# Patient Record
Sex: MALE | Race: BLACK OR AFRICAN AMERICAN | NOT HISPANIC OR LATINO | Employment: FULL TIME | URBAN - METROPOLITAN AREA
[De-identification: names, ages, dates, MRNs, and addresses within clinical notes are randomized per-mention and may not be internally consistent; named-entity substitution may affect disease eponyms.]

---

## 2023-09-05 PROBLEM — R47.1 DYSARTHRIA AND ANARTHRIA: Status: ACTIVE | Noted: 2023-09-05

## 2023-09-05 PROBLEM — I63.9 ACUTE ISCHEMIC STROKE: Status: ACTIVE | Noted: 2023-09-05

## 2023-09-05 PROBLEM — I63.9 CVA (CEREBRAL VASCULAR ACCIDENT): Status: ACTIVE | Noted: 2023-09-05

## 2023-09-05 PROBLEM — I16.1 HYPERTENSIVE EMERGENCY: Status: ACTIVE | Noted: 2023-09-05

## 2023-09-05 PROBLEM — G81.94 HEMIPARESIS, LEFT: Status: ACTIVE | Noted: 2023-09-05

## 2023-09-05 PROBLEM — I10 HYPERTENSION: Chronic | Status: ACTIVE | Noted: 2023-09-05

## 2023-09-13 PROBLEM — Z12.11 COLON CANCER SCREENING: Status: ACTIVE | Noted: 2023-09-13

## 2023-10-06 PROBLEM — R06.81 WITNESSED EPISODE OF APNEA: Status: ACTIVE | Noted: 2023-10-06

## 2023-10-06 PROBLEM — G47.30 SLEEP-DISORDERED BREATHING: Status: ACTIVE | Noted: 2023-10-06

## 2023-10-06 PROBLEM — E66.9 OBESITY: Status: ACTIVE | Noted: 2023-10-06

## 2023-10-06 PROBLEM — R53.83 FATIGUE: Status: ACTIVE | Noted: 2023-10-06

## 2023-10-06 PROBLEM — R06.83 SNORING: Status: ACTIVE | Noted: 2023-10-06

## 2023-10-06 PROBLEM — G47.33 OSA (OBSTRUCTIVE SLEEP APNEA): Status: ACTIVE | Noted: 2023-10-06

## 2023-10-06 PROBLEM — G47.19 EXCESSIVE DAYTIME SLEEPINESS: Status: ACTIVE | Noted: 2023-10-06

## 2023-10-06 PROBLEM — Z91.89 AT RISK FOR OBSTRUCTIVE SLEEP APNEA: Status: ACTIVE | Noted: 2023-10-06

## 2023-10-06 PROBLEM — G47.8 NON-RESTORATIVE SLEEP: Status: ACTIVE | Noted: 2023-10-06

## 2023-10-20 ENCOUNTER — TELEPHONE (OUTPATIENT)
Dept: ADMINISTRATIVE | Facility: HOSPITAL | Age: 53
End: 2023-10-20

## 2023-10-20 VITALS — SYSTOLIC BLOOD PRESSURE: 139 MMHG | DIASTOLIC BLOOD PRESSURE: 76 MMHG

## 2023-11-22 PROBLEM — I16.1 HYPERTENSIVE EMERGENCY: Status: RESOLVED | Noted: 2023-09-05 | Resolved: 2023-11-22

## 2023-12-11 PROBLEM — I63.9 CVA (CEREBRAL VASCULAR ACCIDENT): Status: RESOLVED | Noted: 2023-09-05 | Resolved: 2023-12-11

## 2023-12-11 PROBLEM — I63.9 ACUTE ISCHEMIC STROKE: Status: RESOLVED | Noted: 2023-09-05 | Resolved: 2023-12-11

## 2024-03-08 PROBLEM — M79.2 NEUROPATHIC PAIN: Status: ACTIVE | Noted: 2024-03-08

## 2024-03-13 ENCOUNTER — TELEPHONE (OUTPATIENT)
Dept: ADMINISTRATIVE | Facility: HOSPITAL | Age: 54
End: 2024-03-13

## 2024-03-13 VITALS — DIASTOLIC BLOOD PRESSURE: 71 MMHG | SYSTOLIC BLOOD PRESSURE: 130 MMHG

## 2024-04-03 PROBLEM — E78.5 HYPERLIPIDEMIA: Status: ACTIVE | Noted: 2024-04-03

## 2024-10-10 PROBLEM — Z86.73 HISTORY OF STROKE: Status: ACTIVE | Noted: 2024-10-10

## 2024-10-11 PROBLEM — G47.20 CIRCADIAN DYSREGULATION: Status: ACTIVE | Noted: 2024-10-11

## 2024-10-11 PROBLEM — R29.818 SUSPECTED SLEEP APNEA: Status: ACTIVE | Noted: 2024-10-11

## 2024-10-11 PROBLEM — Z72.821 INADEQUATE SLEEP HYGIENE: Status: ACTIVE | Noted: 2024-10-11

## 2025-05-12 PROBLEM — G89.0 THALAMIC PAIN SYNDROME (HYPERESTHETIC): Status: ACTIVE | Noted: 2025-05-12

## 2025-05-12 PROBLEM — G47.09 OTHER INSOMNIA: Status: ACTIVE | Noted: 2025-05-12

## 2025-05-12 PROBLEM — I63.81 THALAMIC STROKE: Status: ACTIVE | Noted: 2023-09-05

## 2025-07-23 ENCOUNTER — OUTPATIENT CASE MANAGEMENT (OUTPATIENT)
Dept: ADMINISTRATIVE | Facility: OTHER | Age: 55
End: 2025-07-23

## 2025-07-23 NOTE — PROGRESS NOTES
Sw received a consult to assist with financial resources. Alda called Patient. A voice message was left requesting he call back.    Amirah Smith LCSW    [phone]

## 2025-07-24 ENCOUNTER — OUTPATIENT CASE MANAGEMENT (OUTPATIENT)
Dept: ADMINISTRATIVE | Facility: OTHER | Age: 55
End: 2025-07-24

## 2025-07-24 NOTE — LETTER
Kenneth Flores  [address] [facility] [address]      Dear Kenneth Flores,     I am writing from the Outpatient Care Management Department at [facility]. I received a referral from [facility] to contact you regarding any needs you may have. I was unable to reach you by phone. Please contact me for assistance.     I can be reached at [phone] Monday thru Friday from 8:00am to 4:30pm.      Additionally, [facility] also has a program with a nurse available 24/7 to answer questions or provide medical advice. [facility] can be reached at [phone].      Sincerely,        Amirah Smith LCSW  Outpatient Care Management

## 2025-07-24 NOTE — PROGRESS NOTES
Alda received a consult to assist with financial resources. Alda called Patient. A voice message was left requesting he call back. Alda mailed Patient a letter providing him Alda's office number. He was encouraged to call.    Amirah Smith LCSW    [phone]

## 2025-07-28 ENCOUNTER — OUTPATIENT CASE MANAGEMENT (OUTPATIENT)
Dept: ADMINISTRATIVE | Facility: OTHER | Age: 55
End: 2025-07-28

## 2025-07-28 NOTE — PROGRESS NOTES
Sw received a consult to assist with Financial Resources. Alda called Patient. A voice message was left requesting he call back.    Amirah Smith LCSW    [phone]

## 2025-08-08 ENCOUNTER — OUTPATIENT CASE MANAGEMENT (OUTPATIENT)
Dept: ADMINISTRATIVE | Facility: OTHER | Age: 55
End: 2025-08-08